# Patient Record
Sex: MALE | Race: WHITE
[De-identification: names, ages, dates, MRNs, and addresses within clinical notes are randomized per-mention and may not be internally consistent; named-entity substitution may affect disease eponyms.]

---

## 2019-04-24 NOTE — [facility]EC[facility]
CASE MANAGEMENT DISCHARGE SUMMARY
 
 
PATIENT: CA SENA                    UNIT: [id number]
ACCOUNT#: [id number]                       ADM DATE: 19
AGE: 73     : 41  SEX: M            ROOM/BED: D.1209    
AUTHOR: EDWIGE,DOC                             PHYSICIAN:                               
 
REFERRING PHYSICIAN: KIKI PARRA MD                
DATE OF SERVICE: 19
Discharge Plan
 
 
Patient Name: CA SENA
Facility: [facility]:[place]
Encounter #: [id number]
Medical Record #: [id number]
: 1945
Planned Disposition: Home
Anticipated Discharge Date: 
 
Discharge Date: 
Expected LOS: 
Initial Reviewer: [email or web address]
Initial Review Date: 2019
Generated: 19   1:20 pm 
Comments
 
DCP- Discharge Planning
 
Updated by [id number]: Haley Starks on 19  11:19 am CT
Patient Name: CA SENA                                     
Admission Status: ER   
Accout number: [id number]                              
Admission Date: 2019   
: 1945                                                        
Admission Diagnosis:ACUTE POSTHEMORRHAGIC ANEMIA   
Attending: KIKI PARRA                                                
Current LOS:  3   
  
Anticipated DC Date:    
Planned Disposition: Home   
Primary Insurance: [facility] MEDICARE SOLUTIONS   
  
  
Discharge Planning Comments: CM met with patient and wife Crystal at bedside.  
Patient states that he lives at home with his wife (Crystal) and plans to 
return there upon discharge.  Patient states that he has a wheelchair manual 
and electric.  Patient states that he has had home health in the past but not 
recently.  Patient denies any discharge needs at this time. D/c IMM explained 
 
and served 19 @1107.  CM will continue to follow and assist as needed 
with discharge planning / needs.   
  
  
  
  
  
  
: Haley Starks
 DCPIA - Discharge Planning Initial Assessment
 
Updated by [email or web address]: Haley Starks on 19  12:14 pm
*  Is the patient Alert and Oriented?
Yes
*  How many steps to enter\exit or inside your home? ramp *  PCP BRYCE WOODALL *  Pharmacy
[facility]
*  Preadmission Environment
Home with Family
*  ADLs
Independent
*  Other Equipment
WHEELCHAIR, ELECTRIC WHEELCHAIR
*  List name and contact numbers for known caregivers / representatives who 
currently or will assist patient after discharge:
CRYSTAL SENA - WIFE- [phone]
*  Verbal permission to speak to the caregivers and representatives has been 
obtained from the patient.
Yes
*  Community resources currently utilized
None
*  Additional services required to return to the preadmission environment?
No
*  Can the patient safely return to the preadmission environment?
Yes
*  Has this patient been hospitalized within the prior 30 days at any 
hospital?
No
 
 
 
 
 
 
 
Last DP export: 19  11:13 a
Patient Name: CA SENA
 
Encounter #: [id number]
Page [id number]
 
 
 
 
 
Electronically Signed by LEX GIBBONS on 19 at 1220
 
 
 
 
 
 
**All edits/amendments must be made on the electronic document**
 
DICTATION DATE: 19     : HOWARD  19     
RPT#: [id number]                                DC DATE:        
                                               STATUS: ADM IN  
[facility]
 [place] [address]
***END OF REPORT***

## 2019-04-24 NOTE — [facility]EC[facility]
CASE MANAGEMENT DISCHARGE SUMMARY
 
 
PATIENT: CA SENA                    UNIT: [id number]
ACCOUNT#: [id number]                       ADM DATE: 19
AGE: 73     : 41  SEX: M            ROOM/BED: D.1209    
AUTHOR: LEX GIBBONS                             PHYSICIAN:                               
 
REFERRING PHYSICIAN: KIKI PARRA MD                
DATE OF SERVICE: 19
Discharge Plan
 
 
Patient Name: CA SENA
[facility]
Encounter #: [id number]
Medical Record #: [id number]
: 1945
Planned Disposition: Home
Anticipated Discharge Date: 
 
Discharge Date: 
Expected LOS: 
Initial Reviewer: [email or web address]
Initial Review Date: 2019
Generated: 19   1:13 pm 
  
 
 
 
 
 
 
 
Patient Name: CA SENA
 
Encounter #: [id number]
Page [id number]
 
 
 
 
 
Electronically Signed by LEX GIBBONS on 19 at 1213
 
 
 
 
 
 
**All edits/amendments must be made on the electronic document**
 
DICTATION DATE: 19 1213     : HOWARD  19 1213     
RPT#: [id number]                                DC DATE:        
                                               STATUS: ADM IN  
[facility]
[address] [place] [address]
***END OF REPORT***

## 2019-04-24 NOTE — [facility]ECARE
CASE MANAGEMENT DISCHARGE SUMMARY
 
 
PATIENT: CA ROSS                    UNIT: [id number]
ACCOUNT#: [id number]                       ADM DATE: 19
AGE: 73     : 41  SEX: M            ROOM/BED: D.1209    
AUTHOR: EDWIGE,DOC                             PHYSICIAN:                               
 
REFERRING PHYSICIAN: KIKI PARRA MD                
DATE OF SERVICE: 19
Discharge Plan
 
 
Patient Name: CA ROSS
Facility: [facility]:[place]
Encounter #: [id number]
Medical Record #: [id number]
: 1945
Planned Disposition: Home
Anticipated Discharge Date: 
 
Discharge Date: 2019
Expected LOS: 
Initial Reviewer: [email or web address]
Initial Review Date: 2019
Generated: 19   6:26 pm 
Comments
 
DCP- Discharge Planning
 
Updated by [email or web address]: Haley Starks on 19  11:19 am CT
Patient Name: CA ROSS                                     
Admission Status: ER   
Accout number: [id number]                              
Admission Date: 2019   
: 1945                                                        
Admission Diagnosis:ACUTE POSTHEMORRHAGIC ANEMIA   
Attending: KIKI PARRA                                                
Current LOS:  3   
  
Anticipated DC Date:    
Planned Disposition: Home   
Primary Insurance: [facility] MEDICARE SOLUTIONS   
  
  
Discharge Planning Comments: CM met with patient and wife Crystal at bedside.  
Patient states that he lives at home with his wife (Crystal) and plans to 
return there upon discharge.  Patient states that he has a wheelchair manual 
and electric.  Patient states that he has had home health in the past but not 
recently.  Patient denies any discharge needs at this time. D/c IMM explained 
 
and served 19 @1107.  CM will continue to follow and assist as needed 
with discharge planning / needs.   
  
  
  
  
  
  
: Haley Starks
 DCPIA - Discharge Planning Initial Assessment
 
Updated by [email or web address]: Haley Starks on 19  12:14 pm
*  Is the patient Alert and Oriented?
Yes
*  How many steps to enter\exit or inside your home? ramp *  PCP BRYCE WOODALL *  Pharmacy
[facility]
*  Preadmission Environment
Home with Family
*  ADLs
Independent
*  Other Equipment
WHEELCHAIR, ELECTRIC WHEELCHAIR
*  List name and contact numbers for known caregivers / representatives who 
currently or will assist patient after discharge:
CRYSTAL ROSS - WIFE- [phone]
*  Verbal permission to speak to the caregivers and representatives has been 
obtained from the patient.
Yes
*  Community resources currently utilized
None
*  Additional services required to return to the preadmission environment?
No
*  Can the patient safely return to the preadmission environment?
Yes
*  Has this patient been hospitalized within the prior 30 days at any 
hospital?
No
 
 
 
 
 
Coverage Notice
 
Reviewer: [email or web address] Sachi Starks
 
Notice Issued Date-Time: 2019  11:07
Notice Type: IM Discharge Notice
 
Notice Delivered To: Family Member
Relationship to Patient: Spouse
Representative Name: Crystal Ross
 
 
Delivery Method: HAND - Hand Delivered
Ream Days:
Prior Verbal Notification: 
 
Recipient Understood Notice: Yes
Recipient Signature: Yes
Med Rec Note Co-signed by Attending:
 
Coverage Notice Comment:  
 
Last DP export: 19  11:20 a
Patient Name: CA ROSS
Encounter #: [id number]
Page [id number]
 
 
 
 
 
Electronically Signed by LEX GBIBONS on 19 at 1726
 
 
 
 
 
 
**All edits/amendments must be made on the electronic document**
 
DICTATION DATE: 19     : HOWARD  19     
RPT#: [id number]                                DC DATE:19
                                               STATUS: DIS IN  
[facility]
[address] [place] [address]
***END OF REPORT***

## 2019-07-23 ENCOUNTER — HOSPITAL ENCOUNTER (OUTPATIENT)
Dept: HOSPITAL 84 - D.LAB | Age: 74
Discharge: HOME | End: 2019-07-23
Attending: INTERNAL MEDICINE
Payer: MEDICARE

## 2019-07-23 VITALS — BODY MASS INDEX: 20.3 KG/M2

## 2019-07-23 DIAGNOSIS — Q27.30: ICD-10-CM

## 2019-07-23 DIAGNOSIS — D64.9: ICD-10-CM

## 2019-07-23 DIAGNOSIS — K59.09: Primary | ICD-10-CM

## 2019-07-23 LAB
BASOPHILS NFR BLD AUTO: 0.5 % (ref 0–2)
EOSINOPHIL NFR BLD: 8.2 % (ref 0–7)
ERYTHROCYTE [DISTWIDTH] IN BLOOD BY AUTOMATED COUNT: 16 % (ref 11.5–14.5)
HCT VFR BLD CALC: 41.1 % (ref 42–54)
HGB BLD-MCNC: 14 G/DL (ref 13.5–17.5)
IMM GRANULOCYTES NFR BLD: 0.2 % (ref 0–5)
LYMPHOCYTES NFR BLD AUTO: 18.6 % (ref 15–50)
MCH RBC QN AUTO: 30.6 PG (ref 26–34)
MCHC RBC AUTO-ENTMCNC: 34.1 G/DL (ref 31–37)
MCV RBC: 89.9 FL (ref 80–100)
MONOCYTES NFR BLD: 8.2 % (ref 2–11)
NEUTROPHILS NFR BLD AUTO: 64.3 % (ref 40–80)
PLATELET # BLD: 162 10X3/UL (ref 130–400)
PMV BLD AUTO: 9.6 FL (ref 7.4–10.4)
RBC # BLD AUTO: 4.57 10X6/UL (ref 4.2–6.1)
WBC # BLD AUTO: 6.2 10X3/UL (ref 4.8–10.8)